# Patient Record
Sex: MALE | Race: WHITE
[De-identification: names, ages, dates, MRNs, and addresses within clinical notes are randomized per-mention and may not be internally consistent; named-entity substitution may affect disease eponyms.]

---

## 2018-08-16 PROBLEM — Z00.00 ENCOUNTER FOR PREVENTIVE HEALTH EXAMINATION: Status: ACTIVE | Noted: 2018-08-16

## 2023-01-26 ENCOUNTER — APPOINTMENT (OUTPATIENT)
Dept: UROLOGY | Facility: CLINIC | Age: 48
End: 2023-01-26

## 2023-02-24 ENCOUNTER — APPOINTMENT (OUTPATIENT)
Dept: ORTHOPEDIC SURGERY | Facility: CLINIC | Age: 48
End: 2023-02-24
Payer: MEDICARE

## 2023-02-24 VITALS — WEIGHT: 146 LBS | BODY MASS INDEX: 19.35 KG/M2 | HEIGHT: 73 IN

## 2023-02-24 DIAGNOSIS — Z78.9 OTHER SPECIFIED HEALTH STATUS: ICD-10-CM

## 2023-02-24 PROCEDURE — 73140 X-RAY EXAM OF FINGER(S): CPT | Mod: LT

## 2023-02-24 PROCEDURE — 99203 OFFICE O/P NEW LOW 30 MIN: CPT

## 2023-02-24 NOTE — DISCUSSION/SUMMARY
[de-identified] : Discussed the nature of the diagnosis and risk and benefits of different modalities of treatment.\par Xrays reviewed. \par Discussed that there is minimal treatment options available for the IP joint. \par He will manage conservatively. Warm compress and NSAIDs.\par Referred to Dr. Fay for a second opinion.\par PRN.

## 2023-02-24 NOTE — HISTORY OF PRESENT ILLNESS
[Dull/Aching] : dull/aching [Localized] : localized [Tightness] : tightness [de-identified] : 47  year old male was reaching for a drawer and sustained hyperextension injury to his LEFT thumb.  Has seen orthopedics, where he describes PRP and prolo therapy.  Comes in with Xrays from [facility].  [] : no [FreeTextEntry1] : Lt thumb [FreeTextEntry3] : one year ago [FreeTextEntry5] : Patient hyperextended the lt thumb reaching for a dresser drawer.  [de-identified] : xrays

## 2023-02-24 NOTE — PHYSICAL EXAM
[1st] : 1st [IP Joint] : IP joint [Left] : left fingers [FreeTextEntry3] : pain with resisted FPL  [de-identified] : IP  [FreeTextEntry9] : possible periarticular erosion thumb IP

## 2023-03-01 ENCOUNTER — APPOINTMENT (OUTPATIENT)
Dept: PAIN MANAGEMENT | Facility: CLINIC | Age: 48
End: 2023-03-01
Payer: MEDICARE

## 2023-03-01 VITALS — HEIGHT: 73 IN | BODY MASS INDEX: 20.54 KG/M2 | WEIGHT: 155 LBS

## 2023-03-01 DIAGNOSIS — M79.18 MYALGIA, OTHER SITE: ICD-10-CM

## 2023-03-01 PROCEDURE — 99204 OFFICE O/P NEW MOD 45 MIN: CPT

## 2023-03-01 RX ORDER — LIDOCAINE 36 MG/1
1.8 PATCH TOPICAL
Qty: 2 | Refills: 0 | Status: ACTIVE | COMMUNITY
Start: 2023-03-01 | End: 1900-01-01

## 2023-03-01 RX ORDER — CLONAZEPAM 2 MG/1
2 TABLET ORAL
Refills: 0 | Status: ACTIVE | COMMUNITY

## 2023-03-01 RX ORDER — TRAMADOL HYDROCHLORIDE 100 MG/1
100 CAPSULE ORAL
Refills: 0 | Status: ACTIVE | COMMUNITY

## 2023-03-01 RX ORDER — ELECTROLYTES/DEXTROSE
SOLUTION, ORAL ORAL
Refills: 0 | Status: ACTIVE | COMMUNITY

## 2023-03-01 RX ORDER — TIZANIDINE 4 MG/1
TABLET ORAL
Refills: 0 | Status: ACTIVE | COMMUNITY

## 2023-03-01 NOTE — HISTORY OF PRESENT ILLNESS
[Lower back] : lower back [9] : 9 [7] : 7 [Dull/Aching] : dull/aching [Radiating] : radiating [Sharp] : sharp [Throbbing] : throbbing [Constant] : constant [Sleep] : sleep [Rest] : rest [Meds] : meds [Physical therapy] : physical therapy [Injection therapy] : injection therapy [FreeTextEntry1] : 03/01/2023 : Patient presents for initial evaluation. He reports in his early 20's he had diffuse pain and was given TPI's. 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 He had interstitial cystitis and had resultant pelvic floor dysfunction. He saw [facility] neurology and they requested sleep studies. His biggest issue for now is sleep.  He has seen Dr. Reece and Dr. Ribera. \par \par Subjective Weakness: Yes\par Numbness/Tingling: No\par Bladder/Bowel dysfunction: Yes\par Physical Therapy: Yes\par \par \par Attempted modalities for current pain complaint:\par See above:\par Medications: Klonipin since 2000. \par \par Injections: TPI's \par \par Previous Spine Surgery: N/A\par \par \par \par   [] : no [FreeTextEntry7] : left hip, ankle  [FreeTextEntry9] : patches, tens units, PRP injections  [de-identified] : 2014 [de-identified] : 2021 MRI

## 2023-03-01 NOTE — ASSESSMENT
[FreeTextEntry1] : After discussing various treatment options with the patient including but not limited to oral medications, physical therapy, exercise, modalities as well as interventional spinal injections, we have decided with the following plan:\par \par He is looking to more to restart his Klonipin for help sleeping. He would be better off seeing a psychiatrist to better manage this as pain and anxiety go hand in hand. His major complaint is difficulty sleeping.

## 2023-03-01 NOTE — PHYSICAL EXAM
[Normal Mood and Affect] : normal mood and affect [Orientated] : orientated [Able to Communicate] : able to communicate [Well Developed] : well developed

## 2023-03-08 ENCOUNTER — APPOINTMENT (OUTPATIENT)
Dept: ORTHOPEDIC SURGERY | Facility: CLINIC | Age: 48
End: 2023-03-08
Payer: MEDICARE

## 2023-03-08 PROCEDURE — 99213 OFFICE O/P EST LOW 20 MIN: CPT

## 2023-03-08 NOTE — HISTORY OF PRESENT ILLNESS
[Dull/Aching] : dull/aching [Localized] : localized [Tightness] : tightness [de-identified] : 3/8/23: Pt has continued pain in his left thumb.\par \par Dr Snow's note from 2/24/23:\par 47  year old male was reaching for a drawer and sustained hyperextension injury to his LEFT thumb.  Has seen orthopedics, where he describes PRP and prolo therapy.  Comes in with Xrays from [facility].  [] : no [FreeTextEntry1] : Lt thumb [FreeTextEntry3] : one year ago [FreeTextEntry5] : Patient hyperextended the lt thumb reaching for a dresser drawer.  [de-identified] : xrays

## 2023-03-08 NOTE — ASSESSMENT
[FreeTextEntry1] : The patient was advised of the diagnosis. The natural history of the pathology was explained in full to the patient in layman's terms. All questions were answered. The risks and benefits of surgical and non-surgical treatment alternatives were explained in full to the patient.\par \par MRI left thumb\par F/u after testing

## 2023-03-08 NOTE — IMAGING
[de-identified] : left thumb:\par no swelling/ecchymosis\par pain with stress of UCL at IP\par farom\par nvid [Left] : left fingers [There are no fractures, subluxations or dislocations. No significant abnormalities are seen] : There are no fractures, subluxations or dislocations. No significant abnormalities are seen

## 2023-04-18 ENCOUNTER — RESULT REVIEW (OUTPATIENT)
Age: 48
End: 2023-04-18

## 2023-04-19 ENCOUNTER — APPOINTMENT (OUTPATIENT)
Dept: PAIN MANAGEMENT | Facility: CLINIC | Age: 48
End: 2023-04-19
Payer: MEDICARE

## 2023-04-19 VITALS — WEIGHT: 150 LBS | BODY MASS INDEX: 19.88 KG/M2 | HEIGHT: 73 IN

## 2023-04-19 DIAGNOSIS — S63.622A SPRAIN OF INTERPHALANGEAL JOINT OF LEFT THUMB, INITIAL ENCOUNTER: ICD-10-CM

## 2023-04-19 PROCEDURE — 99213 OFFICE O/P EST LOW 20 MIN: CPT

## 2023-04-19 RX ORDER — DARIDOREXANT 50 MG/1
50 TABLET, FILM COATED ORAL
Refills: 0 | Status: ACTIVE | COMMUNITY

## 2023-04-19 NOTE — PHYSICAL EXAM
[Normal Mood and Affect] : normal mood and affect [Orientated] : orientated [Able to Communicate] : able to communicate [Well Developed] : well developed [] : medial joint line tenderness

## 2023-04-19 NOTE — HISTORY OF PRESENT ILLNESS
[Lower back] : lower back [Dull/Aching] : dull/aching [Radiating] : radiating [Sharp] : sharp [Constant] : constant [Rest] : rest [Sleep] : sleep [Meds] : meds [Physical therapy] : physical therapy [Injection therapy] : injection therapy [7] : 7 [4] : 4 [Shooting] : shooting [] : no [FreeTextEntry1] : left Thumb  [FreeTextEntry6] : sore [FreeTextEntry7] : left hip to the knee and ankle  [FreeTextEntry9] : patches, tens units, PRP injections  [de-identified] : activity  [de-identified] : 2014 [de-identified] : 2021 MRI

## 2023-04-19 NOTE — ASSESSMENT
[FreeTextEntry1] : After discussing various treatment options with the patient including but not limited to oral medications, physical therapy, exercise, modalities as well as interventional spinal injections, we have decided with the following plan:\par \par He is looking more to restart his Klonipin for help sleeping. He would be better off seeing a psychiatrist to better manage this as pain and anxiety go hand in hand. His major complaint is difficulty sleeping.

## 2024-01-22 ENCOUNTER — APPOINTMENT (OUTPATIENT)
Dept: COLORECTAL SURGERY | Facility: CLINIC | Age: 49
End: 2024-01-22

## 2024-01-26 ENCOUNTER — APPOINTMENT (OUTPATIENT)
Dept: ORTHOPEDIC SURGERY | Facility: CLINIC | Age: 49
End: 2024-01-26

## 2024-03-15 ENCOUNTER — APPOINTMENT (OUTPATIENT)
Dept: ORTHOPEDIC SURGERY | Facility: CLINIC | Age: 49
End: 2024-03-15